# Patient Record
Sex: MALE | Race: WHITE | HISPANIC OR LATINO | Employment: UNEMPLOYED | RURAL
[De-identification: names, ages, dates, MRNs, and addresses within clinical notes are randomized per-mention and may not be internally consistent; named-entity substitution may affect disease eponyms.]

---

## 2023-07-08 PROBLEM — K85.90 PANCREATITIS: Status: ACTIVE | Noted: 2023-07-08

## 2023-07-08 PROBLEM — I10 HTN (HYPERTENSION): Status: ACTIVE | Noted: 2023-07-08

## 2023-07-08 PROBLEM — E03.9 HYPOTHYROIDISM (ACQUIRED): Status: ACTIVE | Noted: 2023-07-08

## 2023-07-08 PROBLEM — E11.9 TYPE 2 DIABETES MELLITUS: Status: ACTIVE | Noted: 2023-07-08

## 2023-07-11 ENCOUNTER — TELEPHONE (OUTPATIENT)
Dept: FAMILY MEDICINE CLINIC | Facility: CLINIC | Age: 38
End: 2023-07-11

## 2023-07-11 NOTE — TELEPHONE ENCOUNTER
Caller: [facility]    Relationship to patient: Other    Best call back number: [phone]    New or established patient?  [x] New  [] Established    Date of discharge: 07/11/23    Facility discharged from: [facility]    Diagnosis/Symptoms: PANCREATITIS     Length of stay (If applicable):     Specialty Only: Did you see a [facility] health provider?    [] Yes  [] No  If so, who?

## 2023-07-14 PROBLEM — E78.5 DYSLIPIDEMIA: Status: ACTIVE | Noted: 2023-07-14

## 2023-07-17 PROBLEM — Z86.718 HISTORY OF DVT (DEEP VEIN THROMBOSIS): Status: ACTIVE | Noted: 2023-07-17

## 2023-07-17 PROBLEM — E66.01 MORBID (SEVERE) OBESITY DUE TO EXCESS CALORIES: Status: ACTIVE | Noted: 2023-07-17

## 2023-07-17 PROBLEM — R10.13 DYSPEPSIA: Status: ACTIVE | Noted: 2023-07-17

## 2023-07-17 PROBLEM — Z87.19 HISTORY OF ACUTE PANCREATITIS: Status: ACTIVE | Noted: 2023-07-17

## 2023-07-21 ENCOUNTER — LAB (OUTPATIENT)
Dept: LAB | Facility: HOSPITAL | Age: 38
End: 2023-07-21
Payer: MEDICAID

## 2023-07-21 DIAGNOSIS — E03.9 HYPOTHYROIDISM (ACQUIRED): ICD-10-CM

## 2023-07-21 DIAGNOSIS — E78.5 DYSLIPIDEMIA: ICD-10-CM

## 2023-07-21 DIAGNOSIS — I10 PRIMARY HYPERTENSION: ICD-10-CM

## 2023-07-21 DIAGNOSIS — Z79.4 TYPE 2 DIABETES MELLITUS WITH DIABETIC POLYNEUROPATHY, WITH LONG-TERM CURRENT USE OF INSULIN: ICD-10-CM

## 2023-07-21 DIAGNOSIS — R10.13 DYSPEPSIA: ICD-10-CM

## 2023-07-21 DIAGNOSIS — E11.42 TYPE 2 DIABETES MELLITUS WITH DIABETIC POLYNEUROPATHY, WITH LONG-TERM CURRENT USE OF INSULIN: ICD-10-CM

## 2023-07-21 DIAGNOSIS — Z87.19 HISTORY OF ACUTE PANCREATITIS: ICD-10-CM

## 2023-07-22 LAB
ALBUMIN SERPL-MCNC: 4.7 G/DL (ref 4.1–5.1)
ALBUMIN/GLOB SERPL: 1.7 {RATIO} (ref 1.2–2.2)
ALP SERPL-CCNC: 70 IU/L (ref 44–121)
ALT SERPL-CCNC: 69 IU/L (ref 0–44)
AMYLASE SERPL-CCNC: 96 U/L (ref 31–110)
AST SERPL-CCNC: 37 IU/L (ref 0–40)
BASOPHILS # BLD AUTO: 0 X10E3/UL (ref 0–0.2)
BASOPHILS NFR BLD AUTO: 1 %
BILIRUB SERPL-MCNC: 0.7 MG/DL (ref 0–1.2)
BUN SERPL-MCNC: 13 MG/DL (ref 6–20)
BUN/CREAT SERPL: 15 (ref 9–20)
CALCIUM SERPL-MCNC: 9.8 MG/DL (ref 8.7–10.2)
CHLORIDE SERPL-SCNC: 103 MMOL/L (ref 96–106)
CHOLEST SERPL-MCNC: 113 MG/DL (ref 100–199)
CO2 SERPL-SCNC: 21 MMOL/L (ref 20–29)
CREAT SERPL-MCNC: 0.89 MG/DL (ref 0.76–1.27)
EGFRCR SERPLBLD CKD-EPI 2021: 112 ML/MIN/1.73
EOSINOPHIL # BLD AUTO: 0.2 X10E3/UL (ref 0–0.4)
EOSINOPHIL NFR BLD AUTO: 4 %
ERYTHROCYTE [DISTWIDTH] IN BLOOD BY AUTOMATED COUNT: 14 % (ref 11.6–15.4)
GLOBULIN SER CALC-MCNC: 2.8 G/DL (ref 1.5–4.5)
GLUCOSE SERPL-MCNC: ABNORMAL MG/DL
HCT VFR BLD AUTO: 47.1 % (ref 37.5–51)
HDLC SERPL-MCNC: 40 MG/DL
HGB BLD-MCNC: 15.5 G/DL (ref 13–17.7)
IMM GRANULOCYTES # BLD AUTO: 0 X10E3/UL (ref 0–0.1)
IMM GRANULOCYTES NFR BLD AUTO: 0 %
LDLC SERPL CALC-MCNC: 56 MG/DL (ref 0–99)
LIPASE SERPL-CCNC: 114 U/L (ref 13–78)
LYMPHOCYTES # BLD AUTO: 1.3 X10E3/UL (ref 0.7–3.1)
LYMPHOCYTES NFR BLD AUTO: 24 %
MCH RBC QN AUTO: 27.4 PG (ref 26.6–33)
MCHC RBC AUTO-ENTMCNC: 32.9 G/DL (ref 31.5–35.7)
MCV RBC AUTO: 83 FL (ref 79–97)
MICROALBUMIN UR-MCNC: 83.4 UG/ML
MONOCYTES # BLD AUTO: 0.4 X10E3/UL (ref 0.1–0.9)
MONOCYTES NFR BLD AUTO: 8 %
NEUTROPHILS # BLD AUTO: 3.4 X10E3/UL (ref 1.4–7)
NEUTROPHILS NFR BLD AUTO: 63 %
PLATELET # BLD AUTO: 142 X10E3/UL (ref 150–450)
POTASSIUM SERPL-SCNC: ABNORMAL MMOL/L
PROT SERPL-MCNC: 7.5 G/DL (ref 6–8.5)
RBC # BLD AUTO: 5.66 X10E6/UL (ref 4.14–5.8)
SODIUM SERPL-SCNC: 141 MMOL/L (ref 134–144)
T4 FREE SERPL-MCNC: 1.18 NG/DL (ref 0.82–1.77)
TRIGL SERPL-MCNC: 88 MG/DL (ref 0–149)
TSH SERPL DL<=0.005 MIU/L-ACNC: 0.91 UIU/ML (ref 0.45–4.5)
VLDLC SERPL CALC-MCNC: 17 MG/DL (ref 5–40)
WBC # BLD AUTO: 5.3 X10E3/UL (ref 3.4–10.8)

## 2023-07-24 ENCOUNTER — TELEPHONE (OUTPATIENT)
Dept: FAMILY MEDICINE CLINIC | Facility: CLINIC | Age: 38
End: 2023-07-24
Payer: MEDICAID

## 2023-07-24 NOTE — TELEPHONE ENCOUNTER
Phone conversation with patient regarding test results from 7/21/2023 as follows:    TSH and free T4 normal  Cholesterol profile normal  CBC normal other than a slightly low platelet count of 142,000  Lipase slightly elevated at 114, significantly decreased from greater than 2500 recently during the hospital, amylase normal at 96  CMP with normal renal function, electrolytes normal other than potassium not obtained for technical reasons by lab, liver function testing revealing a slight elevation of ALT at 69 otherwise normal  Urine microalbumin 83.4, mildly elevated  Hemoglobin A1c recently obtained in the hospital on 7/9/2023 elevated at 8.7%, not repeated on current lab profile    Assessment/plan:  1.  Acute pancreatitis for which he was recently hospitalized.  Felt likely medication related possibly laded to his Victoza Farxiga or HCTZ.  These have all been held and patient is clinically improving.  2.  Mild isolated elevation of ALT.  Likely related to his obesity.  We will simply follow liver function testing in the next month and add a viral hepatitis profile.  3.  Diabetes mellitus type 2.  Suboptimal control per recent hemoglobin A1c in the hospital at 8.7%.  Planning adjustment of his Levemir per recent discussion in the office.  4.  Very borderline thrombocytopenia.  Simply repeat testing in the next month.  5.  Remainder of laboratory testing very satisfactory.  6.  Keep appointment for follow-up visit on 8/28/2023.

## 2023-08-11 ENCOUNTER — TELEPHONE (OUTPATIENT)
Dept: FAMILY MEDICINE CLINIC | Facility: CLINIC | Age: 38
End: 2023-08-11
Payer: MEDICAID

## 2023-08-11 RX ORDER — LEVOTHYROXINE SODIUM 0.1 MG/1
100 TABLET ORAL DAILY
Qty: 90 TABLET | Refills: 1 | Status: SHIPPED | OUTPATIENT
Start: 2023-08-11

## 2023-08-11 RX ORDER — LOSARTAN POTASSIUM 100 MG/1
100 TABLET ORAL
Qty: 30 TABLET | Refills: 1 | Status: SHIPPED | OUTPATIENT
Start: 2023-08-11

## 2023-08-11 NOTE — TELEPHONE ENCOUNTER
Caller: Robert Dickey    Relationship: Self    Best call back number: [phone]     Requested Prescriptions:   Requested Prescriptions     Pending Prescriptions Disp Refills    losartan (COZAAR) 100 MG tablet 30 tablet 1     Sig: Take 1 tablet by mouth Daily.        Pharmacy where request should be sent: [facility] [id number] [address] [address] AT [address] - [phone] [facility] [phone] FX     Last office visit with prescribing clinician: 7/17/2023   Last telemedicine visit with prescribing clinician: Visit date not found   Next office visit with prescribing clinician: 8/28/2023     Additional details provided by patient: PATIENT STATED HE HAS BEEN OUT OF MEDICATION AS OF YESTERDAY.    Does the patient have less than a 3 day supply:  [x] Yes  [] No    Would you like a call back once the refill request has been completed: [x] Yes [] No    If the office needs to give you a call back, can they leave a voicemail: [x] Yes [] No    Valentin Gutierrez Rep   08/11/23 14:57 EDT

## 2023-08-28 ENCOUNTER — OFFICE VISIT (OUTPATIENT)
Dept: FAMILY MEDICINE CLINIC | Facility: CLINIC | Age: 38
End: 2023-08-28
Payer: MEDICAID

## 2023-08-28 VITALS
HEIGHT: 69 IN | BODY MASS INDEX: 44.58 KG/M2 | TEMPERATURE: 97.6 F | WEIGHT: 301 LBS | DIASTOLIC BLOOD PRESSURE: 80 MMHG | SYSTOLIC BLOOD PRESSURE: 122 MMHG | OXYGEN SATURATION: 98 % | HEART RATE: 72 BPM

## 2023-08-28 DIAGNOSIS — Z79.4 TYPE 2 DIABETES MELLITUS WITH HYPERGLYCEMIA, WITH LONG-TERM CURRENT USE OF INSULIN: Primary | ICD-10-CM

## 2023-08-28 DIAGNOSIS — E11.42 TYPE 2 DIABETES MELLITUS WITH DIABETIC POLYNEUROPATHY, WITH LONG-TERM CURRENT USE OF INSULIN: ICD-10-CM

## 2023-08-28 DIAGNOSIS — E11.65 TYPE 2 DIABETES MELLITUS WITH HYPERGLYCEMIA, WITH LONG-TERM CURRENT USE OF INSULIN: Primary | ICD-10-CM

## 2023-08-28 DIAGNOSIS — E66.01 MORBID (SEVERE) OBESITY DUE TO EXCESS CALORIES: ICD-10-CM

## 2023-08-28 DIAGNOSIS — Z86.718 HISTORY OF DVT (DEEP VEIN THROMBOSIS): ICD-10-CM

## 2023-08-28 DIAGNOSIS — Z87.19 HISTORY OF ACUTE PANCREATITIS: ICD-10-CM

## 2023-08-28 DIAGNOSIS — I10 PRIMARY HYPERTENSION: ICD-10-CM

## 2023-08-28 DIAGNOSIS — Z79.4 TYPE 2 DIABETES MELLITUS WITH DIABETIC POLYNEUROPATHY, WITH LONG-TERM CURRENT USE OF INSULIN: ICD-10-CM

## 2023-08-28 DIAGNOSIS — E03.9 HYPOTHYROIDISM (ACQUIRED): ICD-10-CM

## 2023-08-28 DIAGNOSIS — E78.5 DYSLIPIDEMIA: ICD-10-CM

## 2023-08-28 LAB
EXPIRATION DATE: NORMAL
GLUCOSE BLDC GLUCOMTR-MCNC: 370 MG/DL (ref 70–130)
HBA1C MFR BLD: 10.1 %
Lab: NORMAL

## 2023-08-28 PROCEDURE — 3074F SYST BP LT 130 MM HG: CPT | Performed by: INTERNAL MEDICINE

## 2023-08-28 PROCEDURE — 3079F DIAST BP 80-89 MM HG: CPT | Performed by: INTERNAL MEDICINE

## 2023-08-28 PROCEDURE — 3046F HEMOGLOBIN A1C LEVEL >9.0%: CPT | Performed by: INTERNAL MEDICINE

## 2023-08-28 PROCEDURE — 83036 HEMOGLOBIN GLYCOSYLATED A1C: CPT | Performed by: INTERNAL MEDICINE

## 2023-08-28 PROCEDURE — 1160F RVW MEDS BY RX/DR IN RCRD: CPT | Performed by: INTERNAL MEDICINE

## 2023-08-28 PROCEDURE — 1159F MED LIST DOCD IN RCRD: CPT | Performed by: INTERNAL MEDICINE

## 2023-08-28 PROCEDURE — 82948 REAGENT STRIP/BLOOD GLUCOSE: CPT | Performed by: INTERNAL MEDICINE

## 2023-08-28 PROCEDURE — 99214 OFFICE O/P EST MOD 30 MIN: CPT | Performed by: INTERNAL MEDICINE

## 2023-08-28 NOTE — PROGRESS NOTES
Answers submitted by the patient for this visit:  Primary Reason for Visit (Submitted on 2023)  What is the primary reason for your visit?: Diabetes  Diabetes Questionnaire (Submitted on 2023)  Chief Complaint: Diabetes problem  Diabetes type: type 2  MedicAlert ID: [id number]  Disease duration: 8 Years  foot paresthesias: Yes  foot ulcerations: No  visual change: Yes  Symptom course: worsening  headaches: No  hunger: Yes  mood changes: Yes  sleepiness: Yes  sweats: Yes  blackouts: No  hospitalization: No  nocturnal hypoglycemia: No  required assistance: No  required glucagon: No  CVA: No  heart disease: No  nephropathy: Yes  peripheral neuropathy: No  PVD: Yes  retinopathy: Yes  CAD risks: dyslipidemia, family history, hypertension, obesity, sedentary lifestyle, stress  Current treatments: insulin injections, oral agent (dual therapy)  Treatment compliance: most of the time  Dose schedule: at bedtime  Given by: patient  Injection sites: abdominal wall  Home blood tests: 1-2 x per day  Monitoring compliance: inadequate  Blood glucose trend: fluctuating minimally  breakfast time: after 10 am  breakfast glucose level: >200  lunch time: after 3 pm  lunch glucose level: >200  dinner time: 6-7 pm  dinner glucose level: >200  High score: >200  Overall: >200  Weight trend: fluctuating minimally  Current diet: low fat/cholesterol  Meal planning: avoidance of concentrated sweets  Exercise: intermittently  Dietitian visit: No  Eye exam current: No  Sees podiatrist: No      Follow Up Office Visit      Date: 2023   Patient Name: Robert Dickey  : 1985   MRN: [id number]     Chief Complaint:    Chief Complaint   Patient presents with    Follow-up       History of Present Illness: Robert Dickey is a 37 y.o. male who is here today for 1 month follow-up in this pleasant 37-year-old male who was admitted to [facility] secondary to an acute pancreatitis felt potentially related to his  prior prescription for his type 2 diabetes of Victoza Farxiga or HCTZ all of which medications were discontinued.  I saw him in follow-up with significant clinical improvement, most recent lipase having essentially normalized.  He was noted to have suboptimal diabetic control, simply on his metformin 1000 mg twice daily and Levemir 40 mg daily, with me prompting him to increase the Levemir to 5 units nightly, with further instructions to titrate up every 3 to 4 days until his fasting morning blood glucose is less than 120.  Unfortunately he continued just the Levemir 45 units nightly, in addition to the metformin, with morning blood glucose fasting between 230-300 and evening blood sugars in the 300 range.  He is no longer on the Victoza or Farxiga given potential trigger for his pancreatitis.  Patient does have secondary neuropathy in his feet with occasional burning sensation in objective abnormalities as detailed below on his exam.  Has a lifestyle that is generally sedentary, fair diet.  Has hypothyroidism taking levothyroxine 100 mcg daily with normal TSH and free T4 last month, history of dyslipidemia on Lipitor 40 mg nightly.  History of hypertension taking losartan 100 mg daily with blood pressures averaging 135/85-90.  Also has a history of very borderline thrombocytopenia with a platelet count of 142,000 and 7/2023.  He also has a history of a prior lower extremity DVT, for which he does take Pletal rather than an an anticoagulant.    Subjective      Review of Systems:   Review of Systems    I have reviewed the patients family history, social history, past medical history, past surgical history and have updated it as appropriate.     Medications:     Current Outpatient Medications:     atorvastatin (LIPITOR) 40 MG tablet, Take 1 tablet by mouth Daily., Disp: , Rfl:     cilostazol (PLETAL) 50 MG tablet, Take 1 tablet by mouth 2 (Two) Times a Day., Disp: , Rfl:     insulin detemir (LEVEMIR) 100 UNIT/ML  "injection, Inject 45 Units under the skin into the appropriate area as directed Daily., Disp: 180 mL, Rfl: 3    levothyroxine (SYNTHROID, LEVOTHROID) 100 MCG tablet, Take 1 tablet by mouth Daily., Disp: 90 tablet, Rfl: 1    losartan (COZAAR) 100 MG tablet, Take 1 tablet by mouth Daily., Disp: 30 tablet, Rfl: 1    metFORMIN (GLUCOPHAGE) 1000 MG tablet, Take 1 tablet by mouth 2 (Two) Times a Day With Meals., Disp: , Rfl:     Allergies:   Allergies   Allergen Reactions    Asa [Aspirin] Itching    Farxiga [Dapagliflozin] Other (See Comments)     Pancreatitis    Pineapple Other (See Comments)     Abdominal pain    Victoza [Liraglutide] Other (See Comments)     Pancreatitis       Objective     Physical Exam: Please see above  Vital Signs:   Vitals:    08/28/23 1451   BP: 122/80   BP Location: Left arm   Patient Position: Sitting   Cuff Size: Adult   Pulse: 72   Temp: 97.6 øF (36.4 øC)   TempSrc: Temporal   SpO2: 98%   Weight: (!) 137 kg (301 lb)   Height: 175.3 cm (69\")     Body mass index is 44.45 kg/mý.  Class 3 Severe Obesity (BMI >=40). Obesity-related health conditions include the following: hypertension, diabetes mellitus, and dyslipidemias. Obesity is unchanged. BMI is is above average; BMI management plan is completed. We discussed low calorie, low carb based diet program, portion control, and increasing exercise.       Physical Exam  General: Pleasant 37-year-old male who is in no acute distress, BMI of 44.5 reflecting a 7 pound weight loss in the last month  Lungs clear with no wheeze tachypnea or cough  Cardiac regular rate rhythm with no murmurs gallops or rubs  Bipedal exam with 2+ pulses, no edema, no skin breakdown, decrease sensation in both distal feet to fine touch and vibration with normal sensation to pinprick, motor exam normal  Procedures    Results:   Labs:   Hemoglobin A1C   Date Value Ref Range Status   08/28/2023 10.1 % Final   07/09/2023 8.70 (H) 4.80 - 5.60 % Final     TSH   Date Value Ref " Range Status   07/21/2023 0.910 0.450 - 4.500 uIU/mL Final   07/09/2023 0.607 0.270 - 4.200 uIU/mL Final        POCT Results (if applicable):   Results for orders placed or performed in visit on 08/28/23   POC Glycosylated Hemoglobin (Hb A1C)    Specimen: Blood   Result Value Ref Range    Hemoglobin A1C 10.1 %    Lot Number [id number]     Expiration Date 05/07/2025    POCT Glucose    Specimen: Blood   Result Value Ref Range    Glucose 370 (A) 70 - 130 mg/dL       Imaging:   No valid procedures specified.     Assessment / Plan      Assessment/Plan:   Diagnoses and all orders for this visit:    1. Type 2 diabetes mellitus with hyperglycemia, with long-term current use of insulin (Primary)  -     POC Glycosylated Hemoglobin (Hb A1C)  -     POCT Glucose  Suboptimal control of diabetes in fact having had an increase in hemoglobin A1c today of 10.1% versus previous value last month of 8.7%.  This reflects several reasons, including discontinuation of Victoza and Farxiga given potential triggers for his acute pancreatitis last month, currently taking only metformin 1000 mg twice daily, and his Levemir currently at 45 units nightly, with patient having failed increase his Levemir per titration instructions.  I discussed again, and patient recited back to me, appropriate titration protocol for now increase Levemir up to 50 units nightly, then further increasing every 3 to 4 days per instructions, specifically increasing by 5 units if blood sugar greater than 200, and by 3 units if blood sugar less than under, with ideal range .  Assess in 2 months.  2. Type 2 diabetes mellitus with diabetic polyneuropathy, with long-term current use of insulin  -     POC Glycosylated Hemoglobin (Hb A1C)  -     POCT Glucose  Refer to above.  3. Primary hypertension  Satisfactory blood pressure acutely, borderline chronically currently taking losartan 100 mg daily.  Continue current regimen with healthy lifestyle changes and regular  monitoring.  4. Dyslipidemia  On atorvastatin 40 mg nightly, recommending to pursue healthy lifestyle.  Satisfactory lipid profile from 7/2023.  5. Hypothyroidism (acquired)  Taking levothyroxine 100 mcg daily with satisfactory thyroid function testing from 7/2023.  6. History of acute pancreatitis  Supplies secondarily in 7/2023, etiology definitively uncertain but potentially related to Victoza versus Farxiga versus HCTZ, all of which were discontinued, as well as potentially autoimmune trigger.  Appears to be clinically resolved.  7. Morbid (severe) obesity due to excess calories  Discussed health risks of his obesity.  He has lost 7 pounds in weight since last visit but I suspect this represents decline in his glycemic control.  As his control improves with insulin anticipate the weight will be put back on.  Discussed need to increase exercise and pursue a healthy diet.  Discussed weight loss options including the fact he would be an excellent candidate for bariatric surgery but this is not covered by insurance.  May also consider phentermine in the future depending on his blood pressure control.  8. History of DVT (deep vein thrombosis)  Currently taking Pletal prophylaxis for prior DVT.  Certainly less optimal but less potential side effects compared to ended anticoagulation.  Details regarding his DVT unknown.  May not require any prophylaxis at this stage.    Follow Up:   Return in about 2 months (around 10/28/2023) for Recheck.      At [facility], we believe that sharing information builds trust and better relationships. You are receiving this note because you recently visited [facility]. It is possible you will see health information before a provider has talked with you about it. This kind of information can be easy to misunderstand. To help you fully understand what it means for your health, we urge you to discuss this note with your provider.    Jeff Salter MD  [facility]

## 2023-09-11 ENCOUNTER — APPOINTMENT (OUTPATIENT)
Dept: CT IMAGING | Facility: HOSPITAL | Age: 38
End: 2023-09-11
Payer: MEDICAID

## 2023-09-11 ENCOUNTER — HOSPITAL ENCOUNTER (EMERGENCY)
Facility: HOSPITAL | Age: 38
Discharge: HOME | End: 2023-09-11
Attending: EMERGENCY MEDICINE
Payer: MEDICAID

## 2023-09-11 VITALS
OXYGEN SATURATION: 96 % | HEIGHT: 70 IN | RESPIRATION RATE: 18 BRPM | TEMPERATURE: 98.2 F | BODY MASS INDEX: 43.81 KG/M2 | DIASTOLIC BLOOD PRESSURE: 107 MMHG | SYSTOLIC BLOOD PRESSURE: 163 MMHG | WEIGHT: 306 LBS | HEART RATE: 74 BPM

## 2023-09-11 DIAGNOSIS — E11.65 HYPERGLYCEMIA DUE TO DIABETES MELLITUS: ICD-10-CM

## 2023-09-11 DIAGNOSIS — R19.7 DIARRHEA, UNSPECIFIED TYPE: ICD-10-CM

## 2023-09-11 DIAGNOSIS — R11.0 NAUSEA: ICD-10-CM

## 2023-09-11 DIAGNOSIS — R10.84 GENERALIZED ABDOMINAL PAIN: Primary | ICD-10-CM

## 2023-09-11 LAB
ALBUMIN SERPL-MCNC: 4.3 G/DL (ref 3.5–5.2)
ALBUMIN/GLOB SERPL: 1.2 G/DL
ALP SERPL-CCNC: 81 U/L (ref 39–117)
ALT SERPL W P-5'-P-CCNC: 47 U/L (ref 1–41)
ANION GAP SERPL CALCULATED.3IONS-SCNC: 13 MMOL/L (ref 5–15)
AST SERPL-CCNC: 26 U/L (ref 1–40)
BACTERIA UR QL AUTO: NORMAL /HPF
BASOPHILS # BLD AUTO: 0.03 10*3/MM3 (ref 0–0.2)
BASOPHILS NFR BLD AUTO: 0.4 % (ref 0–1.5)
BILIRUB SERPL-MCNC: 0.9 MG/DL (ref 0–1.2)
BILIRUB UR QL STRIP: NEGATIVE
BUN SERPL-MCNC: 16 MG/DL (ref 6–20)
BUN/CREAT SERPL: 17.6 (ref 7–25)
CALCIUM SPEC-SCNC: 9.7 MG/DL (ref 8.6–10.5)
CHLORIDE SERPL-SCNC: 101 MMOL/L (ref 98–107)
CLARITY UR: ABNORMAL
CO2 SERPL-SCNC: 23 MMOL/L (ref 22–29)
COLOR UR: YELLOW
CREAT SERPL-MCNC: 0.91 MG/DL (ref 0.76–1.27)
D-LACTATE SERPL-SCNC: 1.4 MMOL/L (ref 0.5–2)
DEPRECATED RDW RBC AUTO: 38.8 FL (ref 37–54)
EGFRCR SERPLBLD CKD-EPI 2021: 109.3 ML/MIN/1.73
EOSINOPHIL # BLD AUTO: 0.18 10*3/MM3 (ref 0–0.4)
EOSINOPHIL NFR BLD AUTO: 2.1 % (ref 0.3–6.2)
ERYTHROCYTE [DISTWIDTH] IN BLOOD BY AUTOMATED COUNT: 12.8 % (ref 12.3–15.4)
GLOBULIN UR ELPH-MCNC: 3.7 GM/DL
GLUCOSE SERPL-MCNC: 247 MG/DL (ref 65–99)
GLUCOSE UR STRIP-MCNC: ABNORMAL MG/DL
HCT VFR BLD AUTO: 51.4 % (ref 37.5–51)
HGB BLD-MCNC: 16.8 G/DL (ref 13–17.7)
HGB UR QL STRIP.AUTO: NEGATIVE
HOLD SPECIMEN: NORMAL
HYALINE CASTS UR QL AUTO: NORMAL /LPF
IMM GRANULOCYTES # BLD AUTO: 0.02 10*3/MM3 (ref 0–0.05)
IMM GRANULOCYTES NFR BLD AUTO: 0.2 % (ref 0–0.5)
KETONES UR QL STRIP: ABNORMAL
LEUKOCYTE ESTERASE UR QL STRIP.AUTO: NEGATIVE
LIPASE SERPL-CCNC: 35 U/L (ref 13–60)
LYMPHOCYTES # BLD AUTO: 1.41 10*3/MM3 (ref 0.7–3.1)
LYMPHOCYTES NFR BLD AUTO: 16.5 % (ref 19.6–45.3)
MCH RBC QN AUTO: 27.5 PG (ref 26.6–33)
MCHC RBC AUTO-ENTMCNC: 32.7 G/DL (ref 31.5–35.7)
MCV RBC AUTO: 84.1 FL (ref 79–97)
MONOCYTES # BLD AUTO: 0.49 10*3/MM3 (ref 0.1–0.9)
MONOCYTES NFR BLD AUTO: 5.7 % (ref 5–12)
NEUTROPHILS NFR BLD AUTO: 6.4 10*3/MM3 (ref 1.7–7)
NEUTROPHILS NFR BLD AUTO: 75.1 % (ref 42.7–76)
NITRITE UR QL STRIP: NEGATIVE
NRBC BLD AUTO-RTO: 0 /100 WBC (ref 0–0.2)
PH UR STRIP.AUTO: 5.5 [PH] (ref 5–8)
PLATELET # BLD AUTO: 186 10*3/MM3 (ref 140–450)
PMV BLD AUTO: 12.9 FL (ref 6–12)
POTASSIUM SERPL-SCNC: 4.3 MMOL/L (ref 3.5–5.2)
PROT SERPL-MCNC: 8 G/DL (ref 6–8.5)
PROT UR QL STRIP: ABNORMAL
RBC # BLD AUTO: 6.11 10*6/MM3 (ref 4.14–5.8)
RBC # UR STRIP: NORMAL /HPF
REF LAB TEST METHOD: NORMAL
SODIUM SERPL-SCNC: 137 MMOL/L (ref 136–145)
SP GR UR STRIP: 1.04 (ref 1–1.03)
SQUAMOUS #/AREA URNS HPF: NORMAL /HPF
UROBILINOGEN UR QL STRIP: ABNORMAL
WBC # UR STRIP: NORMAL /HPF
WBC NRBC COR # BLD: 8.53 10*3/MM3 (ref 3.4–10.8)
WHOLE BLOOD HOLD COAG: NORMAL
WHOLE BLOOD HOLD SPECIMEN: NORMAL

## 2023-09-11 PROCEDURE — 74177 CT ABD & PELVIS W/CONTRAST: CPT

## 2023-09-11 PROCEDURE — 83605 ASSAY OF LACTIC ACID: CPT | Performed by: PHYSICIAN ASSISTANT

## 2023-09-11 PROCEDURE — 83690 ASSAY OF LIPASE: CPT | Performed by: PHYSICIAN ASSISTANT

## 2023-09-11 PROCEDURE — 99285 EMERGENCY DEPT VISIT HI MDM: CPT

## 2023-09-11 PROCEDURE — 25510000001 IOPAMIDOL 61 % SOLUTION: Performed by: EMERGENCY MEDICINE

## 2023-09-11 PROCEDURE — 80053 COMPREHEN METABOLIC PANEL: CPT | Performed by: PHYSICIAN ASSISTANT

## 2023-09-11 PROCEDURE — 81001 URINALYSIS AUTO W/SCOPE: CPT | Performed by: PHYSICIAN ASSISTANT

## 2023-09-11 PROCEDURE — 85025 COMPLETE CBC W/AUTO DIFF WBC: CPT | Performed by: PHYSICIAN ASSISTANT

## 2023-09-11 RX ORDER — ONDANSETRON 4 MG/1
4 TABLET, ORALLY DISINTEGRATING ORAL EVERY 6 HOURS PRN
Qty: 15 TABLET | Refills: 0 | Status: SHIPPED | OUTPATIENT
Start: 2023-09-11

## 2023-09-11 RX ORDER — DICYCLOMINE HCL 20 MG
20 TABLET ORAL EVERY 6 HOURS PRN
Qty: 20 TABLET | Refills: 0 | Status: SHIPPED | OUTPATIENT
Start: 2023-09-11 | End: 2023-09-16

## 2023-09-11 RX ORDER — SODIUM CHLORIDE 0.9 % (FLUSH) 0.9 %
10 SYRINGE (ML) INJECTION AS NEEDED
Status: DISCONTINUED | OUTPATIENT
Start: 2023-09-11 | End: 2023-09-11 | Stop reason: HOSPADM

## 2023-09-11 RX ADMIN — IOPAMIDOL 90 ML: 612 INJECTION, SOLUTION INTRAVENOUS at 19:41

## 2023-09-11 NOTE — ED PROVIDER NOTES
Subjective   History of Present Illness  Pt is a 37 yo male presenting to ED with complaints of abdominal pain. PMHx significant for HTN, DM and Hypothyroidism. Pt reports he has generalized pain that began yesterday. He has had nausea and non bloody diarrhea. He denies sick contacts. He reports having pancreatitis 2 months ago and is worried it has returned. He reports the pancreatitis believed to be related to Farxigo and he no longer takes. He denies fever, CP, SOB, cough, flank pain or urinary sx. He denies tobacco, drug or ETOH use.     History provided by:  Patient and medical records    Review of Systems   Constitutional:  Negative for fever.   Eyes:  Negative for visual disturbance.   Respiratory:  Negative for cough and shortness of breath.    Cardiovascular:  Negative for chest pain.   Gastrointestinal:  Positive for abdominal pain, diarrhea and nausea. Negative for blood in stool and vomiting.   Genitourinary:  Negative for difficulty urinating, dysuria, flank pain and frequency.   Musculoskeletal:  Negative for back pain.   Neurological:  Negative for dizziness, weakness and headaches.   Psychiatric/Behavioral:  Negative for confusion.      Past Medical History:   Diagnosis Date    Diabetes mellitus     Hypertension     Hypothyroid        Allergies   Allergen Reactions    Asa [Aspirin] Itching    Farxiga [Dapagliflozin] Other (See Comments)     Pancreatitis    Pineapple Other (See Comments)     Abdominal pain    Victoza [Liraglutide] Other (See Comments)     Pancreatitis       No past surgical history on file.    No family history on file.    Social History     Socioeconomic History    Marital status: Single   Tobacco Use    Smoking status: Former     Packs/day: 1.00     Years: 2.00     Pack years: 2.00     Types: Cigarettes     Quit date: 2012     Years since quittin.2    Smokeless tobacco: Never   Vaping Use    Vaping Use: Never used   Substance and Sexual Activity    Alcohol use: Never     Drug use: Never    Sexual activity: Defer           Objective   Physical Exam  Vitals and nursing note reviewed.   Constitutional:       General: He is not in acute distress.     Appearance: He is well-developed. He is obese.   HENT:      Head: Atraumatic.      Nose: Nose normal.   Eyes:      General: Lids are normal.      Conjunctiva/sclera: Conjunctivae normal.      Pupils: Pupils are equal, round, and reactive to light.   Cardiovascular:      Rate and Rhythm: Normal rate and regular rhythm.      Heart sounds: Normal heart sounds.   Pulmonary:      Effort: Pulmonary effort is normal.      Breath sounds: Normal breath sounds.   Abdominal:      General: There is no distension.      Palpations: Abdomen is soft.      Tenderness: There is generalized no abdominal tenderness. There is no guarding or rebound.   Musculoskeletal:         General: No tenderness or deformity. Normal range of motion.      Cervical back: Normal range of motion and neck supple.   Skin:     General: Skin is warm and dry.   Neurological:      Mental Status: He is alert and oriented to person, place, and time.      Sensory: No sensory deficit.   Psychiatric:         Behavior: Behavior normal.       Procedures           ED Course           Recent Results (from the past 24 hour(s))   Comprehensive Metabolic Panel    Collection Time: 09/11/23  5:42 PM    Specimen: Blood   Result Value Ref Range    Glucose 247 (H) 65 - 99 mg/dL    BUN 16 6 - 20 mg/dL    Creatinine 0.91 0.76 - 1.27 mg/dL    Sodium 137 136 - 145 mmol/L    Potassium 4.3 3.5 - 5.2 mmol/L    Chloride 101 98 - 107 mmol/L    CO2 23.0 22.0 - 29.0 mmol/L    Calcium 9.7 8.6 - 10.5 mg/dL    Total Protein 8.0 6.0 - 8.5 g/dL    Albumin 4.3 3.5 - 5.2 g/dL    ALT (SGPT) 47 (H) 1 - 41 U/L    AST (SGOT) 26 1 - 40 U/L    Alkaline Phosphatase 81 39 - 117 U/L    Total Bilirubin 0.9 0.0 - 1.2 mg/dL    Globulin 3.7 gm/dL    A/G Ratio 1.2 g/dL    BUN/Creatinine Ratio 17.6 7.0 - 25.0    Anion Gap 13.0 5.0 -  15.0 mmol/L    eGFR 109.3 >60.0 mL/min/1.73   Lipase    Collection Time: 09/11/23  5:42 PM    Specimen: Blood   Result Value Ref Range    Lipase 35 13 - 60 U/L   CBC Auto Differential    Collection Time: 09/11/23  5:42 PM    Specimen: Blood   Result Value Ref Range    WBC 8.53 3.40 - 10.80 10*3/mm3    RBC 6.11 (H) 4.14 - 5.80 10*6/mm3    Hemoglobin 16.8 13.0 - 17.7 g/dL    Hematocrit 51.4 (H) 37.5 - 51.0 %    MCV 84.1 79.0 - 97.0 fL    MCH 27.5 26.6 - 33.0 pg    MCHC 32.7 31.5 - 35.7 g/dL    RDW 12.8 12.3 - 15.4 %    RDW-SD 38.8 37.0 - 54.0 fl    MPV 12.9 (H) 6.0 - 12.0 fL    Platelets 186 140 - 450 10*3/mm3    Neutrophil % 75.1 42.7 - 76.0 %    Lymphocyte % 16.5 (L) 19.6 - 45.3 %    Monocyte % 5.7 5.0 - 12.0 %    Eosinophil % 2.1 0.3 - 6.2 %    Basophil % 0.4 0.0 - 1.5 %    Immature Grans % 0.2 0.0 - 0.5 %    Neutrophils, Absolute 6.40 1.70 - 7.00 10*3/mm3    Lymphocytes, Absolute 1.41 0.70 - 3.10 10*3/mm3    Monocytes, Absolute 0.49 0.10 - 0.90 10*3/mm3    Eosinophils, Absolute 0.18 0.00 - 0.40 10*3/mm3    Basophils, Absolute 0.03 0.00 - 0.20 10*3/mm3    Immature Grans, Absolute 0.02 0.00 - 0.05 10*3/mm3    nRBC 0.0 0.0 - 0.2 /100 WBC   Green Top (Gel)    Collection Time: 09/11/23  5:42 PM   Result Value Ref Range    Extra Tube Hold for add-ons.    Lavender Top    Collection Time: 09/11/23  5:42 PM   Result Value Ref Range    Extra Tube hold for add-on    Gold Top - SST    Collection Time: 09/11/23  5:42 PM   Result Value Ref Range    Extra Tube Hold for add-ons.    Gray Top    Collection Time: 09/11/23  5:42 PM   Result Value Ref Range    Extra Tube Hold for add-ons.    Light Blue Top    Collection Time: 09/11/23  5:42 PM   Result Value Ref Range    Extra Tube Hold for add-ons.    Lactic Acid, Plasma    Collection Time: 09/11/23  5:42 PM    Specimen: Blood   Result Value Ref Range    Lactate 1.4 0.5 - 2.0 mmol/L   Urinalysis With Microscopic If Indicated (No Culture) - Urine, Clean Catch    Collection Time:  "09/11/23  5:46 PM    Specimen: Urine, Clean Catch   Result Value Ref Range    Color, UA Yellow Yellow, Straw    Appearance, UA Cloudy (A) Clear    pH, UA 5.5 5.0 - 8.0    Specific Gravity, UA 1.042 (H) 1.001 - 1.030    Glucose, UA >=1000 mg/dL (3+) (A) Negative    Ketones, UA 15 mg/dL (1+) (A) Negative    Bilirubin, UA Negative Negative    Blood, UA Negative Negative    Protein,  mg/dL (2+) (A) Negative    Leuk Esterase, UA Negative Negative    Nitrite, UA Negative Negative    Urobilinogen, UA 1.0 E.U./dL 0.2 - 1.0 E.U./dL   Urinalysis, Microscopic Only - Urine, Clean Catch    Collection Time: 09/11/23  5:46 PM    Specimen: Urine, Clean Catch   Result Value Ref Range    RBC, UA 0-2 None Seen, 0-2 /HPF    WBC, UA 0-2 None Seen, 0-2 /HPF    Bacteria, UA None Seen None Seen, Trace /HPF    Squamous Epithelial Cells, UA 0-2 None Seen, 0-2 /HPF    Hyaline Casts, UA 7-12 0 - 6 /LPF    Methodology Automated Microscopy      Note: In addition to lab results from this visit, the labs listed above may include labs taken at another facility or during a different encounter within the last 24 hours. Please correlate lab times with ED admission and discharge times for further clarification of the services performed during this visit.    CT Abdomen Pelvis With Contrast   Final Result   Impression:   Mildly nodular contours of the liver can be seen in the setting of cirrhosis. Unremarkable exam otherwise.            Electronically Signed: Manuel High MD     9/11/2023 6:51 PM CDT     Workstation ID: [id number]        Vitals:    09/11/23 1541   BP: (!) 163/107   BP Location: Left arm   Patient Position: Sitting   Pulse: 74   Resp: 18   Temp: 98.2 °F (36.8 °C)   TempSrc: Oral   SpO2: 96%   Weight: (!) 139 kg (306 lb)   Height: 177.8 cm (70\")     Medications   iopamidol (ISOVUE-300) 61 % injection 90 mL (90 mL Intravenous Given 9/11/23 1941)     ECG/EMG Results (last 24 hours)       ** No results found for the last 24 hours. ** "          No orders to display       DISCHARGE    Patient discharged in stable condition.    Reviewed implications of results, diagnosis, meds, responsibility to follow up, warning signs and symptoms of possible worsening, potential complications and reasons to return to ER.    Patient/Family voiced understanding of above instructions.    Discussed plan for discharge, as there is no emergent indication for admission.  Pt/family is agreeable and understands need for follow up and possible repeat testing.  Pt/family is aware that discharge does not mean that nothing is wrong but that it indicates no emergency is currently present that requires admission and they must continue care with follow-up as given below or with a physician of their choice.     FOLLOW-UP  Jeff Salter MD  [address] [place] [address]   [facility] [address] [phone]    Schedule an appointment as soon as possible for a visit       [facility] EMERGENCY DEPARTMENT  [address]  [facility] [address] [phone]    If symptoms worsen         Medication List        New Prescriptions      dicyclomine 20 MG tablet  Commonly known as: BENTYL  Take 1 tablet by mouth Every 6 (Six) Hours As Needed (abdominal pain) for up to 5 days.     ondansetron ODT 4 MG disintegrating tablet  Commonly known as: ZOFRAN-ODT  Place 1 tablet on the tongue Every 6 (Six) Hours As Needed for Nausea or Vomiting for up to 15 doses.               Where to Get Your Medications        These medications were sent to [facility] - [place], [place] - [address] - [phone]  - [phone] FX  [address], [facility] [address]      Phone: [phone]   dicyclomine 20 MG tablet  ondansetron ODT 4 MG disintegrating tablet                                         Medical Decision Making  Pt is a 39 yo male presenting to ED with complaints of abdominal pain and diarrhea. Labs notable for WBC 8.5, Lactic 1.4, Cr 0.9,  Glucose 247, AG 13, K 4.3, Na 137, Lipase 35 and UA with ketones. CT abd/pelvis without acute emergent findings. Patient feeling better while waiting in ED lobby and all results back prior to ED room after 4 hours. Discussed results with patient and offered meds / fluids but he prefers to go home. He will f/u with PCP and return to ED if new / worse sx. Provided Rx for Bentyl and Zofran.     DD  Pancreatitis, Diverticulitis, Dehydration, DKA, REINA, Electrolyte abnormality     Problems Addressed:  Diarrhea, unspecified type: complicated acute illness or injury  Generalized abdominal pain: complicated acute illness or injury  Hyperglycemia due to diabetes mellitus: chronic illness or injury  Nausea: complicated acute illness or injury    Amount and/or Complexity of Data Reviewed  External Data Reviewed: labs, radiology and notes.     Details: Admission, PCP  Labs: ordered. Decision-making details documented in ED Course.  Radiology: ordered. Decision-making details documented in ED Course.    Risk  Prescription drug management.        Final diagnoses:   Generalized abdominal pain   Hyperglycemia due to diabetes mellitus   Nausea   Diarrhea, unspecified type       ED Disposition  ED Disposition       ED Disposition   Discharge    Condition   Stable    Comment   --               Jeff Salter MD  [address] [place] [address] [place] [address]  [phone]    Schedule an appointment as soon as possible for a visit       [facility] EMERGENCY DEPARTMENT  [address] [facility] [address] [phone]    If symptoms worsen         Medication List        New Prescriptions      dicyclomine 20 MG tablet  Commonly known as: BENTYL  Take 1 tablet by mouth Every 6 (Six) Hours As Needed (abdominal pain) for up to 5 days.     ondansetron ODT 4 MG disintegrating tablet  Commonly known as: ZOFRAN-ODT  Place 1 tablet on the tongue Every 6 (Six) Hours As Needed for Nausea or Vomiting for up to 15 doses.                Where to Get Your Medications        These medications were sent to [facility] - [place], [place] - [address] - [phone]  - [phone] FX  [address], [facility] [address]      Phone: [phone]   dicyclomine 20 MG tablet  ondansetron ODT 4 MG disintegrating tablet            Tawana Espana PA  09/11/23 0988

## 2023-09-13 ENCOUNTER — TELEPHONE (OUTPATIENT)
Dept: FAMILY MEDICINE CLINIC | Facility: CLINIC | Age: 38
End: 2023-09-13
Payer: MEDICAID

## 2023-09-13 NOTE — TELEPHONE ENCOUNTER
Patient contact the office wishing to clarify some lab test results obtained when he presented to the emergency room at [facility] on 9/11/2023 with some GI related symptoms, concerned that this might have represented a recurrence of a previous pancreatitis for which she had been hospitalized a couple months earlier.  I reviewed his ER laboratory testing which included CBC, CMP, lipase and urinalysis, all of which were essentially unremarkable.  I reassured him, as apparently he had been reassured by the ER physician upon discharge, that his symptoms were consistent with a viral gastroenteritis.  Indicates he is feeling better.  Advised to notify me if he has any recurrent concerns otherwise he will keep his scheduled appointment to see me on 10/27/2023.

## 2023-09-13 NOTE — TELEPHONE ENCOUNTER
----- Message from Robert Dickey sent at 9/12/2023  4:19 PM EDT -----  Regarding: [place] con respecto a RAINBOW DRAW  Contact: [phone]  I can’t understand the [place] Draw results.  What does it mean? Thanks.

## 2023-10-11 RX ORDER — LOSARTAN POTASSIUM 100 MG/1
100 TABLET ORAL
Qty: 30 TABLET | Refills: 1 | Status: SHIPPED | OUTPATIENT
Start: 2023-10-11 | End: 2023-10-13 | Stop reason: SDUPTHER

## 2023-10-13 RX ORDER — LOSARTAN POTASSIUM 100 MG/1
100 TABLET ORAL
Qty: 30 TABLET | Refills: 1 | Status: SHIPPED | OUTPATIENT
Start: 2023-10-13

## 2023-10-13 NOTE — TELEPHONE ENCOUNTER
Caller: Robert Dickey    Relationship: Self    Best call back number: [phone]     Requested Prescriptions:   Requested Prescriptions     Pending Prescriptions Disp Refills    insulin detemir (LEVEMIR) 100 UNIT/ML injection 180 mL 3     Sig: Inject 45 Units under the skin into the appropriate area as directed Daily.        Pharmacy where request should be sent: [facility] [id number] - [address] [address] AT [address] - [phone] [facility] [phone] FX     Last office visit with prescribing clinician: 8/28/2023   Last telemedicine visit with prescribing clinician: Visit date not found   Next office visit with prescribing clinician: 10/27/2023     Additional details provided by patient:   PATIENT STATED THAT HIS MEDICATION WAS CALLED INTO THE PHARMACY TODAY 10/13/2023 FOR ONLY 45 UNITS TO BE INJECTED AND AT THIS TIME PATIENT STATED THAT HE IS INJECTING 66 UNITS AND WOULD LIKE FOR THE MEDICATION DIRECTION'S TO BE CHANGED AND CALLED INTO THE PHARMACY WITH THE UPDATED DIRECTIONS TO INJECT 66 UNITS       Does the patient have less than a 3 day supply:  [x] Yes  [] No    Would you like a call back once the refill request has been completed: [] Yes [x] No    If the office needs to give you a call back, can they leave a voicemail: [] Yes [x] No    Valentin Coats Rep   10/13/23 14:53 EDT

## 2023-10-16 ENCOUNTER — TELEPHONE (OUTPATIENT)
Dept: FAMILY MEDICINE CLINIC | Facility: CLINIC | Age: 38
End: 2023-10-16
Payer: MEDICAID

## 2023-10-16 NOTE — TELEPHONE ENCOUNTER
Caller: Robert Dickey    Relationship: Self    Best call back number: [phone]    What medications are you currently taking:   Current Outpatient Medications on File Prior to Visit   Medication Sig Dispense Refill    atorvastatin (LIPITOR) 40 MG tablet Take 1 tablet by mouth Daily.      cilostazol (PLETAL) 50 MG tablet Take 1 tablet by mouth 2 (Two) Times a Day.      insulin detemir (LEVEMIR) 100 UNIT/ML injection Inject 45 Units under the skin into the appropriate area as directed Daily. 180 mL 3    levothyroxine (SYNTHROID, LEVOTHROID) 100 MCG tablet Take 1 tablet by mouth Daily. 90 tablet 1    losartan (COZAAR) 100 MG tablet Take 1 tablet by mouth Daily. 30 tablet 1    metFORMIN (GLUCOPHAGE) 1000 MG tablet Take 1 tablet by mouth 2 (Two) Times a Day With Meals. 30 tablet 1    ondansetron ODT (ZOFRAN-ODT) 4 MG disintegrating tablet Place 1 tablet on the tongue Every 6 (Six) Hours As Needed for Nausea or Vomiting for up to 15 doses. 15 tablet 0     No current facility-administered medications on file prior to visit.       What are your concerns: PT CALLED STATED THAT THE DOSAGE FOR INSULIN WAS SUPPOSE TO BE INCREASED TO 66 UNITS INSTEAD OF 45 UNITS, 45UIT WAS SENT TO PHARMACY INSTEAD OF 66 UNITS.

## 2023-10-23 ENCOUNTER — OFFICE VISIT (OUTPATIENT)
Dept: FAMILY MEDICINE CLINIC | Facility: CLINIC | Age: 38
End: 2023-10-23
Payer: MEDICAID

## 2023-10-23 VITALS
BODY MASS INDEX: 41.92 KG/M2 | OXYGEN SATURATION: 98 % | DIASTOLIC BLOOD PRESSURE: 90 MMHG | HEART RATE: 70 BPM | HEIGHT: 70 IN | WEIGHT: 292.8 LBS | SYSTOLIC BLOOD PRESSURE: 142 MMHG | TEMPERATURE: 98.4 F

## 2023-10-23 DIAGNOSIS — Z87.19 HISTORY OF ACUTE PANCREATITIS: ICD-10-CM

## 2023-10-23 DIAGNOSIS — R10.11 RIGHT UPPER QUADRANT ABDOMINAL PAIN: Primary | ICD-10-CM

## 2023-10-23 DIAGNOSIS — I10 PRIMARY HYPERTENSION: ICD-10-CM

## 2023-10-23 DIAGNOSIS — E66.01 MORBID (SEVERE) OBESITY DUE TO EXCESS CALORIES: ICD-10-CM

## 2023-10-23 DIAGNOSIS — E11.65 INADEQUATELY CONTROLLED DIABETES MELLITUS: ICD-10-CM

## 2023-10-23 DIAGNOSIS — E11.42 TYPE 2 DIABETES MELLITUS WITH DIABETIC POLYNEUROPATHY, WITH LONG-TERM CURRENT USE OF INSULIN: ICD-10-CM

## 2023-10-23 DIAGNOSIS — Z79.4 TYPE 2 DIABETES MELLITUS WITH DIABETIC POLYNEUROPATHY, WITH LONG-TERM CURRENT USE OF INSULIN: ICD-10-CM

## 2023-10-23 DIAGNOSIS — Z28.21 INFLUENZA VACCINATION DECLINED BY PATIENT: ICD-10-CM

## 2023-10-23 LAB
EXPIRATION DATE: ABNORMAL
GLUCOSE BLDC GLUCOMTR-MCNC: 219 MG/DL (ref 70–130)
HBA1C MFR BLD: 9.9 % (ref 4.5–5.7)
Lab: ABNORMAL

## 2023-10-23 RX ORDER — AMLODIPINE BESYLATE 5 MG/1
5 TABLET ORAL DAILY
Qty: 90 TABLET | Refills: 3 | Status: SHIPPED | OUTPATIENT
Start: 2023-10-23

## 2023-10-23 RX ORDER — PANTOPRAZOLE SODIUM 40 MG/1
40 TABLET, DELAYED RELEASE ORAL DAILY
Qty: 90 TABLET | Refills: 3 | Status: SHIPPED | OUTPATIENT
Start: 2023-10-23

## 2023-10-23 NOTE — PROGRESS NOTES
Follow Up Office Visit      Date: 10/23/2023   Patient Name: Robert Dickey  : 1985   MRN: [id number]     Chief Complaint:    Chief Complaint   Patient presents with    Follow-up       History of Present Illness: Robert Dickey is a 38 y.o. male who is here today for presents with family member who functions as an  here for 2-month review.  Last visit he was noted to have suboptimal control of his diabetes with hemoglobin A1c of 10.1%.  Patient previously had an episode of acute pancreatitis in 2023 for which he was hospitalized at [facility], and for which he had discontinuation of his GLP-1 and SGLT as a possible trigger of the symptoms.  He is now taking Levemir 66 units nightly with further titration per protocol along with metformin 1000 mg twice daily, with his morning blood glucose averaging around the mid 100s previously in the 200s.  He had a history of hypertension taking losartan 100 mg daily with blood pressures typically in the 130s to 140s over 80s to 90s.  He also has hypothyroidism taking levothyroxine 100 mcg daily with normal TSH and free T4 in 2023.  His biggest complaint relates to ongoing vague right upper quadrant discomfort that is variable but to some degree always present, possibly increased by food intake but not necessarily, no associated nausea or vomiting, chronically having intermittently loose stools versus normal bowel movements.  He did have a CT scan in 2023 which revealed evidence of an acute pancreatitis, repeat CT scan of his abdomen pelvis in 2023 revealing a vague nodular liver but no other abnormalities noted, gallbladder ultrasound in 2023 with no gallbladder abnormality noted.  Patient also relates last week having a headache that was diffuse, all day, social with dizziness and ringing in ears and nausea which resolved the next day.  No focal neurological symptoms since, no prior history of significant  headaches.      Answers submitted by the patient for this visit:  Primary Reason for Visit (Submitted on 10/18/2023)  What is the primary reason for your visit?: Other  Other (Submitted on 10/18/2023)  Please describe your symptoms.: I haven't been feeling well, especially yesterday, October 17. I feel a strong pressure in my head, pain in my eyes, dizziness, ringing in my ears, nausea, and discomfort in my stomach which has not improved since the last time I was in the emergency room.  Have you had these symptoms before?: Yes  How long have you been having these symptoms?: 1-4 days      Subjective      Review of Systems:   Review of Systems    I have reviewed the patients family history, social history, past medical history, past surgical history and have updated it as appropriate.     Medications:     Current Outpatient Medications:     atorvastatin (LIPITOR) 40 MG tablet, Take 1 tablet by mouth Daily., Disp: , Rfl:     cilostazol (PLETAL) 50 MG tablet, Take 1 tablet by mouth 2 (Two) Times a Day., Disp: , Rfl:     insulin detemir (LEVEMIR) 100 UNIT/ML injection, Inject 66 Units under the skin into the appropriate area as directed Daily., Disp: 180 mL, Rfl: 3    levothyroxine (SYNTHROID, LEVOTHROID) 100 MCG tablet, Take 1 tablet by mouth Daily., Disp: 90 tablet, Rfl: 1    losartan (COZAAR) 100 MG tablet, Take 1 tablet by mouth Daily., Disp: 30 tablet, Rfl: 1    metFORMIN (GLUCOPHAGE) 1000 MG tablet, Take 1 tablet by mouth 2 (Two) Times a Day With Meals., Disp: 30 tablet, Rfl: 1    ondansetron ODT (ZOFRAN-ODT) 4 MG disintegrating tablet, Place 1 tablet on the tongue Every 6 (Six) Hours As Needed for Nausea or Vomiting for up to 15 doses., Disp: 15 tablet, Rfl: 0    amLODIPine (NORVASC) 5 MG tablet, Take 1 tablet by mouth Daily., Disp: 90 tablet, Rfl: 3    pantoprazole (Protonix) 40 MG EC tablet, Take 1 tablet by mouth Daily., Disp: 90 tablet, Rfl: 3    Allergies:   Allergies   Allergen Reactions    Asa [Aspirin]  "Itching    Farxiga [Dapagliflozin] Other (See Comments)     Pancreatitis    Pineapple Other (See Comments)     Abdominal pain    Victoza [Liraglutide] Other (See Comments)     Pancreatitis       Objective     Physical Exam: Please see above  Vital Signs:   Vitals:    10/23/23 1346   BP: 142/90   BP Location: Left arm   Patient Position: Sitting   Cuff Size: Large Adult   Pulse: 70   Temp: 98.4 °F (36.9 °C)   TempSrc: Temporal   SpO2: 98%   Weight: 133 kg (292 lb 12.8 oz)   Height: 177.8 cm (70\")     Body mass index is 42.01 kg/m².          Physical Exam  General: Obese pleasant healthy-appearing 38-year-old male in no acute distress.  Head neck: Anicteric sclera, neck supple with no masses or tenderness  Lungs clear with no wheeze tachypnea or cough  Cardiac regular rate rhythm with no murmurs gallops or rubs, no dependent edema  Abdomen with obesity soft and nontender with detailed assessment of the epigastrium and right upper quadrant, and no organomegaly or masses, normal bowel sounds    Procedures    Results:   Labs:   Hemoglobin A1C   Date Value Ref Range Status   10/23/2023 9.9 (A) 4.5 - 5.7 % Final   07/09/2023 8.70 (H) 4.80 - 5.60 % Final     TSH   Date Value Ref Range Status   07/21/2023 0.910 0.450 - 4.500 uIU/mL Final   07/09/2023 0.607 0.270 - 4.200 uIU/mL Final        POCT Results (if applicable):   Results for orders placed or performed in visit on 10/23/23   POC Glycosylated Hemoglobin (Hb A1C)    Specimen: Blood   Result Value Ref Range    Hemoglobin A1C 9.9 (A) 4.5 - 5.7 %    Lot Number [id number]     Expiration Date 04/13/2025    POC Glucose    Specimen: Blood   Result Value Ref Range    Glucose 219 (A) 70 - 130 mg/dL         Assessment / Plan      Assessment/Plan:   Diagnoses and all orders for this visit:    1. Right upper quadrant abdominal pain (Primary)  -     pantoprazole (Protonix) 40 MG EC tablet; Take 1 tablet by mouth Daily.  Dispense: 90 tablet; Refill: 3  History of hospitalization for " an acute pancreatitis in 7/2023, felt possibly related to either his GLP-1 agonist or SGLT inhibitor, versus potential for autoimmune hepatitis versus idiopathic.  He does complain of some vague constant right upper quadrant discomfort which is variable, sometimes triggered by food intake but not consistently, no associated nausea, investigations initially in 7/2023 including CT scan of his abdomen and pelvis revealing pancreatic changes consistent with an acute pancreatitis, most recent CT scan of abdomen pelvis in 9/2023 revealing simply a slight nodular contour of the liver, right upper quadrant ultrasound in 7/2023 revealing a normal-appearing gallbladder with no gallstones biliary obstruction or gallbladder wall thickening and heterogeneous liver parenchyma with no focal masses, along with a normal-appearing right kidney.  Unlikely clinically this represents an acute or chronic pancreatitis, most recently having had lipase last month that have normalized, possibly related to acute gastritis, less likely to a calculus cholecystitis/gallbladder dysfunction.  Initially will switch omeprazole 20 mg daily over to pantoprazole 40 mg daily, monitoring clinical response.  If clearly no improvement over the next several weeks, simply advise me accordingly at which point we will then proceed with HIDA scan and refer to GI.  2. History of acute pancreatitis  Refer to above.  Most recent lipase normal from 9/2023.  Definitive etiology uncertain but possibly related to his previous GLP-1 agonist or SGLT2 inhibitor versus autoimmune versus idiopathic.  3. Type 2 diabetes mellitus with diabetic polyneuropathy, with long-term current use of insulin  -     POC Glycosylated Hemoglobin (Hb A1C)  -     POC Glucose  Hemoglobin A1c today slightly improved at 9.9% versus previous value of 10.1% 2 months prior in 8/2023.  Advised patient to continue titrating up his Levemir, currently at 66 units nightly, increasing by 3 to 4 units  every 3 to 4 days until his morning fasting blood glucose is less than 120 and ideally between 80 and 100.  He will continue also metformin 1000 mg twice daily, for now holding any GLP-1 agonist or SGLT 2 inhibitor, consideration given to adding a DPP 4 inhibitor or sulfonylurea at follow-up visit if not adequately controlled.  4. Inadequately controlled diabetes mellitus  -     POC Glycosylated Hemoglobin (Hb A1C)  -     POC Glucose  Refer to above  5. Primary hypertension  -     amLODIPine (NORVASC) 5 MG tablet; Take 1 tablet by mouth Daily.  Dispense: 90 tablet; Refill: 3  Stage II elevation acutely as well as chronically taking losartan 100 mg daily.  Add amlodipine 5 mg daily, pursuing healthy lifestyle, monitoring blood pressures regularly with ideal parameters discussed, reassess clinically in 2 months  6. Morbid (severe) obesity due to excess calories  Has lost 14 pounds in weight by intent in the last couple months.  Continue efforts at weight loss, consideration of adding phentermine depending on progress.  7. Influenza vaccination declined by patient  Strongly advised patient to reconsider obtaining his flu vaccine given safety profile and benefits, also advised to obtain Prevnar 20, Tdap, and COVID-19 vaccine series, all of which he currently declines      Follow Up:   Return in about 2 months (around 12/23/2023) for Recheck.      At [facility], we believe that sharing information builds trust and better relationships. You are receiving this note because you recently visited [facility]. It is possible you will see health information before a provider has talked with you about it. This kind of information can be easy to misunderstand. To help you fully understand what it means for your health, we urge you to discuss this note with your provider.    Jeff Salter MD  [facility] [place] [facility]

## 2024-01-08 ENCOUNTER — OFFICE VISIT (OUTPATIENT)
Dept: FAMILY MEDICINE CLINIC | Facility: CLINIC | Age: 39
End: 2024-01-08
Payer: MEDICAID

## 2024-01-08 VITALS
TEMPERATURE: 98.4 F | OXYGEN SATURATION: 96 % | HEART RATE: 88 BPM | HEIGHT: 70 IN | SYSTOLIC BLOOD PRESSURE: 134 MMHG | DIASTOLIC BLOOD PRESSURE: 88 MMHG | BODY MASS INDEX: 41.86 KG/M2 | WEIGHT: 292.4 LBS

## 2024-01-08 DIAGNOSIS — Z87.19 HISTORY OF ACUTE PANCREATITIS: ICD-10-CM

## 2024-01-08 DIAGNOSIS — E11.65 INADEQUATELY CONTROLLED DIABETES MELLITUS: Primary | ICD-10-CM

## 2024-01-08 DIAGNOSIS — E11.42 TYPE 2 DIABETES MELLITUS WITH DIABETIC POLYNEUROPATHY, WITH LONG-TERM CURRENT USE OF INSULIN: ICD-10-CM

## 2024-01-08 DIAGNOSIS — R10.13 DYSPEPSIA: ICD-10-CM

## 2024-01-08 DIAGNOSIS — E66.01 MORBID (SEVERE) OBESITY DUE TO EXCESS CALORIES: ICD-10-CM

## 2024-01-08 DIAGNOSIS — K59.00 CONSTIPATION, UNSPECIFIED CONSTIPATION TYPE: ICD-10-CM

## 2024-01-08 DIAGNOSIS — Z79.4 TYPE 2 DIABETES MELLITUS WITH DIABETIC POLYNEUROPATHY, WITH LONG-TERM CURRENT USE OF INSULIN: ICD-10-CM

## 2024-01-08 LAB
EXPIRATION DATE: ABNORMAL
GLUCOSE BLDC GLUCOMTR-MCNC: 312 MG/DL (ref 70–130)
HBA1C MFR BLD: 10.6 % (ref 4.5–5.7)
Lab: ABNORMAL

## 2024-01-08 PROCEDURE — 3075F SYST BP GE 130 - 139MM HG: CPT | Performed by: INTERNAL MEDICINE

## 2024-01-08 PROCEDURE — 3079F DIAST BP 80-89 MM HG: CPT | Performed by: INTERNAL MEDICINE

## 2024-01-08 PROCEDURE — 1160F RVW MEDS BY RX/DR IN RCRD: CPT | Performed by: INTERNAL MEDICINE

## 2024-01-08 PROCEDURE — 82948 REAGENT STRIP/BLOOD GLUCOSE: CPT | Performed by: INTERNAL MEDICINE

## 2024-01-08 PROCEDURE — 1159F MED LIST DOCD IN RCRD: CPT | Performed by: INTERNAL MEDICINE

## 2024-01-08 PROCEDURE — 83036 HEMOGLOBIN GLYCOSYLATED A1C: CPT | Performed by: INTERNAL MEDICINE

## 2024-01-08 PROCEDURE — 3046F HEMOGLOBIN A1C LEVEL >9.0%: CPT | Performed by: INTERNAL MEDICINE

## 2024-01-08 PROCEDURE — 99214 OFFICE O/P EST MOD 30 MIN: CPT | Performed by: INTERNAL MEDICINE

## 2024-01-08 RX ORDER — INSULIN DETEMIR 100 [IU]/ML
INJECTION, SOLUTION SUBCUTANEOUS
Qty: 15 ML | Refills: 12 | Status: SHIPPED | OUTPATIENT
Start: 2024-01-08 | End: 2024-01-12 | Stop reason: SDUPTHER

## 2024-01-08 RX ORDER — GLIMEPIRIDE 2 MG/1
2 TABLET ORAL
Qty: 30 TABLET | Refills: 5 | Status: SHIPPED | OUTPATIENT
Start: 2024-01-08 | End: 2024-01-12 | Stop reason: SDUPTHER

## 2024-01-08 RX ORDER — POLYETHYLENE GLYCOL 3350 17 G/17G
17 POWDER, FOR SOLUTION ORAL DAILY
COMMUNITY

## 2024-01-08 NOTE — PROGRESS NOTES
Answers submitted by the patient for this visit:  Primary Reason for Visit (Submitted on 2024)  What is the primary reason for your visit?: Diabetes  Diabetes Questionnaire (Submitted on 2024)  Chief Complaint: Diabetes problem  Diabetes type: type 2  MedicAlert ID: No  foot paresthesias: Yes  visual change: Yes  headaches: Yes  hunger: Yes  mood changes: Yes  sleepiness: Yes  sweats: No  blackouts: No  hospitalization: No  nocturnal hypoglycemia: No  required assistance: No  required glucagon: No  CVA: No  heart disease: No  impotence: No  nephropathy: Yes  peripheral neuropathy: Yes  PVD: Yes  CAD risks: family history, hypertension, obesity, sedentary lifestyle, stress  Current treatments: insulin injections, oral agent (dual therapy)  Treatment compliance: all of the time  Dose schedule: pre-breakfast, pre-dinner, at bedtime  Given by: patient  Injection sites: abdominal wall  Home blood tests: 1-2 x per week  Monitoring compliance: adequate  Blood glucose trend: increasing steadily  breakfast time: 5-6 am  breakfast glucose level: >200  dinner time: 7-8 pm  dinner glucose level: >200  High score: >200  Overall: >200  Weight trend: fluctuating minimally  Current diet: generally healthy, low salt  Meal planning: avoidance of concentrated sweets, carbohydrate counting  Exercise: rarely  Dietitian visit: No  Eye exam current: Yes  Sees podiatrist: No      Follow Up Office Visit      Date: 2024   Patient Name: Robert Dickey  : 1985   MRN: [id number]     Chief Complaint:    Chief Complaint   Patient presents with    follow up BP.    Earache    right side pain       History of Present Illness: Robert Dickey is a 38 y.o. male who is here today for routine follow-up 2 and half months after last visit.  Patient has hypothyroidism along with dyslipidemia.  History of pancreatitis several months ago possibly related to previous use of GLP-1 agonist and SGLT2 inhibitor which  has been subsequent discontinued.  Trouble with glycemic control having hemoglobin A1c in 10/2023 of 9.9% unfortunately increased to 10.6% today despite having an increase in his Levemir from 66 units up to 90 units nightly continuing his metformin 1000 mg twice daily.  Blood sugars reportedly averaging 200-2 50 in the mornings though occasionally greater than 300.  Indicates got a diabetic eye evaluation on 1/3/2024.  He still has neuropathic symptoms in his feet with occasional burning and tingling sensations.  This is not lifestyle limiting.  Sedentary lifestyle, admittedly could improve his diet.  He also had episodes of intermittent abdominal pain in conjunction with his pancreatitis which have gotten significantly better especially with the addition of pantoprazole 40 mg daily.  Occasionally will have some diarrhea but more commonly constipation..  Previous workup includes CT of the abdomen pelvis revealing a nodular liver nonspecifically, otherwise unremarkable, right upper quadrant ultrasound from 7/2023 normal.  Continues to decline recommended vaccinations.  Routine laboratory testing from 7/2023 8/20/2023 and 9/20/2023 are unremarkable, including normalization of his previous elevation of lipase.    Subjective      Review of Systems:   Review of Systems    I have reviewed the patients family history, social history, past medical history, past surgical history and have updated it as appropriate.     Medications:     Current Outpatient Medications:     amLODIPine (NORVASC) 5 MG tablet, Take 1 tablet by mouth Daily., Disp: 90 tablet, Rfl: 3    atorvastatin (LIPITOR) 40 MG tablet, Take 1 tablet by mouth Daily., Disp: , Rfl:     cilostazol (PLETAL) 50 MG tablet, Take 1 tablet by mouth 2 (Two) Times a Day., Disp: , Rfl:     levothyroxine (SYNTHROID, LEVOTHROID) 100 MCG tablet, Take 1 tablet by mouth Daily., Disp: 90 tablet, Rfl: 1    losartan (COZAAR) 100 MG tablet, Take 1 tablet by mouth Daily., Disp: 30  "tablet, Rfl: 1    metFORMIN (GLUCOPHAGE) 1000 MG tablet, Take 1 tablet by mouth 2 (Two) Times a Day With Meals., Disp: 30 tablet, Rfl: 2    ondansetron ODT (ZOFRAN-ODT) 4 MG disintegrating tablet, Place 1 tablet on the tongue Every 6 (Six) Hours As Needed for Nausea or Vomiting for up to 15 doses., Disp: 15 tablet, Rfl: 0    pantoprazole (Protonix) 40 MG EC tablet, Take 1 tablet by mouth Daily., Disp: 90 tablet, Rfl: 3    glimepiride (Amaryl) 2 MG tablet, Take 1 tablet by mouth Every Morning Before Breakfast., Disp: 30 tablet, Rfl: 5    insulin detemir (Levemir) 100 UNIT/ML injection, Inject 50 units twice daily, increasing by 4 units once daily in divided doses until morning blood glucose less than 120 or reaches 75 units twice daily, Disp: 15 mL, Rfl: 12    Allergies:   Allergies   Allergen Reactions    Asa [Aspirin] Itching    Farxiga [Dapagliflozin] Other (See Comments)     Pancreatitis    Pineapple Other (See Comments)     Abdominal pain    Victoza [Liraglutide] Other (See Comments)     Pancreatitis       Objective     Physical Exam: Please see above  Vital Signs:   Vitals:    01/08/24 1317   BP: 134/88   BP Location: Left arm   Patient Position: Sitting   Cuff Size: Large Adult   Pulse: 88   Temp: 98.4 °F (36.9 °C)   TempSrc: Temporal   SpO2: 96%   Weight: 133 kg (292 lb 6.4 oz)   Height: 177.8 cm (70\")     Body mass index is 41.96 kg/m².          Physical Exam  General: Pleasant taller statured obese healthy-appearing 38-year-old male, BMI 41.6 reflecting 1 pound weight gain in the last 3 months.  Head and neck: Anicteric sclera, TMs and canals bilaterally clear, nares clear, oropharynx is clear with good dentition, neck with no adenopathy masses or tenderness  Lungs clear with no tachypnea or cough  Cardiac regular rate rhythm with no murmurs gallops or rubs  Abdomen is significant androgenic obesity soft and nontender with no organomegaly or masses and normal bowel sounds  Neurological exam grossly normal " though not examined in detail today, having previous documentation of diabetic sensory neuropathy  Procedures    Results:   Labs:   Hemoglobin A1C   Date Value Ref Range Status   01/08/2024 10.6 (A) 4.5 - 5.7 % Final   07/09/2023 8.70 (H) 4.80 - 5.60 % Final     TSH   Date Value Ref Range Status   07/21/2023 0.910 0.450 - 4.500 uIU/mL Final   07/09/2023 0.607 0.270 - 4.200 uIU/mL Final        POCT Results (if applicable):   Results for orders placed or performed in visit on 01/08/24   POC Glycosylated Hemoglobin (Hb A1C)    Specimen: Blood   Result Value Ref Range    Hemoglobin A1C 10.6 (A) 4.5 - 5.7 %    Lot Number [id number]     Expiration Date 07/30/2025    POC Glucose    Specimen: Blood   Result Value Ref Range    Glucose 312 (A) 70 - 130 mg/dL         Assessment / Plan      Assessment/Plan:   Diagnoses and all orders for this visit:    1. Inadequately controlled diabetes mellitus (Primary)  -     POC Glycosylated Hemoglobin (Hb A1C)  -     POC Glucose  -     glimepiride (Amaryl) 2 MG tablet; Take 1 tablet by mouth Every Morning Before Breakfast.  Dispense: 30 tablet; Refill: 5  -     insulin detemir (Levemir) 100 UNIT/ML injection; Inject 50 units twice daily, increasing by 4 units once daily in divided doses until morning blood glucose less than 120 or reaches 75 units twice daily  Dispense: 15 mL; Refill: 12  Clinical decline in diabetic control with hemoglobin A1c today of 10.6% versus prior value of 9.9% in 10/2023, this despite having increased his Levemir from 66 units up to 90 units nightly, continuing his metformin 1000 mg twice daily.  Previously had GLP-1 agonist and SGLT2 inhibitor discontinued given possible trigger for episode of pancreatitis for which he had been hospitalized several months ago.  Very poor lifestyle admittedly including sedentary activity level and suboptimal diet.  Discussed need to improve both.  Treatment plan to include increasing Levemir in split doses up to 50 units twice  daily for a total of 100 units daily, increasing by an additional 4 units and split doses every 3 to 4 days till fasting morning blood glucose less than 120, while simultaneously adding Amaryl 2 mg daily, noting DPP 4 inhibitor suboptimal choice given possibility of SGLT2 inhibitor driven pancreatitis as noted above.  Consider continuous glucose monitoring device if continued to have difficulty with management.  2. Type 2 diabetes mellitus with diabetic polyneuropathy, with long-term current use of insulin  -     POC Glycosylated Hemoglobin (Hb A1C)  -     POC Glucose  -     glimepiride (Amaryl) 2 MG tablet; Take 1 tablet by mouth Every Morning Before Breakfast.  Dispense: 30 tablet; Refill: 5  -     insulin detemir (Levemir) 100 UNIT/ML injection; Inject 50 units twice daily, increasing by 4 units once daily in divided doses until morning blood glucose less than 120 or reaches 75 units twice daily  Dispense: 15 mL; Refill: 12  Suboptimal control with plan as noted above.  3. Morbid (severe) obesity due to excess calories  Continues to struggle with weight with no significant change since last seen.  Unfortunately not a candidate as noted above for GLP-1 agonists or SGLT2 inhibitor.  Improve diet with increased regular exercise.  Consider addition of phentermine or similar weight loss product depending on clinical results.  4. Dyspepsia  Scribes improvement but still having some mild dyspepsia primarily related to some food intake, taking pantoprazole 40 mg daily.  Previous pancreatitis as noted several months ago requiring hospitalization, CT of the abdomen pelvis in 9/2023 post hospitalization revealing a mildly nodular liver possibly developmental though cannot rule out cirrhosis, with right upper quadrant ultrasound in 7/2023 normal.  No history of alcohol consumption.  Given clinical response we will continue current regimen for now given clinical improvement, considering obtaining HIDA scan if clinical  symptoms do not continue to fluid resolution, and consider GI consultation depending on response ongoing.  5. History of acute pancreatitis  Most likely secondary to either GLP-1 agonist or SGLT2 inhibitor.  Avoiding both along with avoidance of DPP 4 inhibitor.  6. Constipation, unspecified constipation type  Initiate MiraLAX 1/2-1 capful daily titrating to maintain 1-2 soft bowel movements daily.    Follow Up:   Return in about 2 months (around 3/8/2024) for Annual physical.      At [facility], we believe that sharing information builds trust and better relationships. You are receiving this note because you recently visited [facility]. It is possible you will see health information before a provider has talked with you about it. This kind of information can be easy to misunderstand. To help you fully understand what it means for your health, we urge you to discuss this note with your provider.    Jeff Salter MD  [facility]

## 2024-01-12 DIAGNOSIS — E11.65 INADEQUATELY CONTROLLED DIABETES MELLITUS: ICD-10-CM

## 2024-01-12 DIAGNOSIS — E11.42 TYPE 2 DIABETES MELLITUS WITH DIABETIC POLYNEUROPATHY, WITH LONG-TERM CURRENT USE OF INSULIN: ICD-10-CM

## 2024-01-12 DIAGNOSIS — Z79.4 TYPE 2 DIABETES MELLITUS WITH DIABETIC POLYNEUROPATHY, WITH LONG-TERM CURRENT USE OF INSULIN: ICD-10-CM

## 2024-01-12 RX ORDER — GLIMEPIRIDE 2 MG/1
2 TABLET ORAL
Qty: 30 TABLET | Refills: 5 | Status: SHIPPED | OUTPATIENT
Start: 2024-01-12

## 2024-01-12 RX ORDER — INSULIN DETEMIR 100 [IU]/ML
INJECTION, SOLUTION SUBCUTANEOUS
Qty: 15 ML | Refills: 12 | Status: SHIPPED | OUTPATIENT
Start: 2024-01-12

## 2024-02-12 ENCOUNTER — TELEPHONE (OUTPATIENT)
Dept: FAMILY MEDICINE CLINIC | Facility: CLINIC | Age: 39
End: 2024-02-12
Payer: MEDICAID

## 2024-02-23 RX ORDER — ATORVASTATIN CALCIUM 40 MG/1
40 TABLET, FILM COATED ORAL DAILY
Qty: 90 TABLET | Refills: 1 | Status: SHIPPED | OUTPATIENT
Start: 2024-02-23

## 2024-02-23 RX ORDER — LEVOTHYROXINE SODIUM 0.1 MG/1
100 TABLET ORAL DAILY
Qty: 90 TABLET | Refills: 1 | Status: SHIPPED | OUTPATIENT
Start: 2024-02-23